# Patient Record
Sex: FEMALE | Race: ASIAN | NOT HISPANIC OR LATINO | URBAN - METROPOLITAN AREA
[De-identification: names, ages, dates, MRNs, and addresses within clinical notes are randomized per-mention and may not be internally consistent; named-entity substitution may affect disease eponyms.]

---

## 2022-04-29 ENCOUNTER — EMERGENCY (EMERGENCY)
Facility: HOSPITAL | Age: 22
LOS: 0 days | Discharge: HOME | End: 2022-04-30
Attending: EMERGENCY MEDICINE
Payer: COMMERCIAL

## 2022-04-29 VITALS
RESPIRATION RATE: 17 BRPM | DIASTOLIC BLOOD PRESSURE: 82 MMHG | SYSTOLIC BLOOD PRESSURE: 129 MMHG | HEART RATE: 95 BPM | WEIGHT: 160.06 LBS | OXYGEN SATURATION: 98 % | TEMPERATURE: 98 F | HEIGHT: 64 IN
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
## 2022-04-29 DIAGNOSIS — S01.81XA LACERATION WITHOUT FOREIGN BODY OF OTHER PART OF HEAD, INITIAL ENCOUNTER: ICD-10-CM

## 2022-04-29 DIAGNOSIS — S01.511A LACERATION WITHOUT FOREIGN BODY OF LIP, INITIAL ENCOUNTER: ICD-10-CM
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
## 2022-04-29 PROCEDURE — 12011 RPR F/E/E/N/L/M 2.5 CM/<: CPT

## 2022-04-29 PROCEDURE — 99283 EMERGENCY DEPT VISIT LOW MDM: CPT | Mod: 25

## 2022-04-29 NOTE — ED PROVIDER NOTE - SKIN COLOR
between philtrum on upper lip has < 1 cm laceration, superficial, with no extension tthrough the vermillion border. Inner upper lip has small bitemark on L. upper lip, does not go through and through to other superficial laceration. Also < 1 cm, superficial, and does not require suture repair. Has some superficial abrasion to the R. side of laceration on upper lip.

## 2022-04-29 NOTE — ED PROVIDER NOTE - CLINICAL SUMMARY MEDICAL DECISION MAKING FREE TEXT BOX
Ddx: Superficial laceration, no need for suture repair  Plan: Discussed with patient about likely small scar, as well as repair with tissue adhesive. Pt and mother in agreement, and laceration repaired. Antibiotic given.

## 2022-04-29 NOTE — ED PROVIDER NOTE - OBJECTIVE STATEMENT
Pt is a 20 yo lady with no significant past medical history who presents to the ED with mechanical fall. She tripped and fell forward and tried to catch herself with hands but still hit her face on the floor. No loc, but has laceration to upper lip. No neck pain. Is up to date on tdap. No loose teeth, no jaw misalignment.

## 2022-04-29 NOTE — ED PROVIDER NOTE - PATIENT PORTAL LINK FT
You can access the FollowMyHealth Patient Portal offered by [facility] by registering at the following website: [email or web address]. By joining [email or web address]’s FollowMyHealth portal, you will also be able to view your health information using other applications (apps) compatible with our system.

## 2022-04-29 NOTE — ED PROVIDER NOTE - CARE PROVIDER_API CALL
Ritesh Pierre (MD)  Plastic Surgery  [address] [place] [address], [address]  [place], [place] [id number]  Phone: [phone]  Fax: [phone]  Follow Up Time: 4-6 Days

## 2022-04-29 NOTE — ED ADULT NURSE NOTE - OBJECTIVE STATEMENT
c/o pain and laceration to above upper lip area. not actively bleeding. denies fever/chills/blurry vision/n/v/d/difficulty swallowing or breathing. A&O x4. states she took tetanus vaccine 10 months ago.

## 2022-04-29 NOTE — ED ADULT NURSE NOTE - NSIMPLEMENTINTERV_GEN_ALL_ED
Implemented All Universal Safety Interventions:  [place] to call system. Call bell, personal items and telephone within reach. Instruct patient to call for assistance. Room bathroom lighting operational. Non-slip footwear when patient is off stretcher. Physically safe environment: no spills, clutter or unnecessary equipment. Stretcher in lowest position, wheels locked, appropriate side rails in place.

## 2022-04-30 VITALS
OXYGEN SATURATION: 98 % | HEART RATE: 89 BPM | DIASTOLIC BLOOD PRESSURE: 85 MMHG | RESPIRATION RATE: 16 BRPM | SYSTOLIC BLOOD PRESSURE: 130 MMHG

## 2022-04-30 RX ADMIN — Medication 1 TABLET(S): at 00:22

## 2022-05-03 ENCOUNTER — EMERGENCY (EMERGENCY)
Age: 22
LOS: 1 days | Discharge: HOME | End: 2022-05-03
Attending: PEDIATRICS | Admitting: PEDIATRICS
Payer: MEDICAID

## 2022-05-03 VITALS
OXYGEN SATURATION: 97 % | WEIGHT: 177.91 LBS | SYSTOLIC BLOOD PRESSURE: 121 MMHG | RESPIRATION RATE: 18 BRPM | DIASTOLIC BLOOD PRESSURE: 69 MMHG | HEART RATE: 97 BPM | TEMPERATURE: 98 F

## 2022-05-03 PROCEDURE — 99283 EMERGENCY DEPT VISIT LOW MDM: CPT

## 2022-05-03 RX ORDER — BACITRACIN ZINC 500 UNIT/G
1 OINTMENT IN PACKET (EA) TOPICAL ONCE
Refills: 0 | Status: DISCONTINUED | OUTPATIENT
Start: 2022-05-03 | End: 2022-05-07

## 2022-05-03 NOTE — ED PROVIDER NOTE - PATIENT PORTAL LINK FT
You can access the FollowMyHealth Patient Portal offered by [facility] by registering at the following website: [email or web address]. By joining [employer]’s FollowMyHealth portal, you will also be able to view your health information using other applications (apps) compatible with our system. You can access the FollowMyHealth Patient Portal offered by [facility] by registering at the following website: [email or web address]. By joining [employer]’s FollowMyHealth portal, you will also be able to view your health information using other applications (apps) compatible with our system. You can access the FollowMyHealth Patient Portal offered by [facility] by registering at the following website: [email or web address]. By joining [employer]’s FollowMyHealth portal, you will also be able to view your health information using other applications (apps) compatible with our system.

## 2022-05-03 NOTE — ED PROVIDER NOTE - PHYSICAL EXAMINATION
Gauzes adherent to philtrum due to dermabond.  Removed with gel and saline  Abrasion to philtrum, granulation tissue.  Small abrasion to inner lip, c/w with bite marks, not through-and-through

## 2022-05-03 NOTE — ED PROVIDER NOTE - OBJECTIVE STATEMENT
Nicole is a 21y F here for evaluation of lip injury  Was sustained several days ago, fell and injured philtrum  Seen at [place] dermabond applied and patient said she was instructed not to change gauze for 4-5 days.  Pt attempted to remove gauze today but it was stuck.  NO fevers, no drainage.  Has been on augmentin as prescribed Nicole is a 21y F here for evaluation of lip injury  Was sustained several days ago, fell and injured philtrum  Seen at [place] dermabond applied and patient said she was instructed not to change gauze for 4-5 days.  Pt attempted to remove gauze today but it was stuck.  NO fevers, no drainage.  Has been on augmentin as prescribed Nicole is a 21y F here for evaluation of lip injury  Was sustained several days ago, fell and injured philtrum  Seen at [place] dermabond applied and patient said she was instructed not to change gauze for 4-5 days.  Pt attempted to remove gauze today but it was stuck.  NO fevers, no drainage.  Has been on augmentin as prescribed

## 2022-05-03 NOTE — ED PROVIDER NOTE - NSFOLLOWUPINSTRUCTIONS_ED_ALL_ED_FT
Please clean daily with warm water and soap and apply neosporin.  Return for fevers, severe pain.    Follow-up with plastic surgery.

## 2022-05-03 NOTE — ED PEDIATRIC TRIAGE NOTE - CHIEF COMPLAINT QUOTE
BIB mom for mouth laceration on upper lip that occurred on Friday. Pt seen at [facility] ER and dx with facial laceration. Pt says they used "glue" for laceration. Tried to remove guaze but unable to do so and now having swelling of the lip. No fevers. Pt Apical pulse auscultated and correlates with VS machine. Denies PMH/PSH. NKDA. Vaccines up to date. BIB mom for mouth laceration on upper lip that occurred on Friday. Pt seen at [facility] ER and dx with facial laceration. Pt says they used "glue" for laceration. Tried to remove guaze but unable to do so and now having swelling of the lip. No fevers. Pt Apical pulse auscultated and correlates with VS machine. Denies PMH/PSH. NKDA. Vaccines up to date. BIB mom for mouth laceration on upper lip that occurred on Friday. Pt seen at [facility] ER and dx with facial laceration. Pt says they used "glue" for laceration. Tried to remove guaze but unable to do so and now having swelling of the lip. No fevers. Pt Apical pulse auscultated and correlates with VS machine. Denies PMH/PSH. NKDA. Vaccines up to date.

## 2022-05-03 NOTE — ED PROVIDER NOTE - CLINICAL SUMMARY MEDICAL DECISION MAKING FREE TEXT BOX
Richie Horton DO (PEM Attending): Lip abrasion sustained 4d ago, seen at , treated with dermabond. Pt unable to remove gauze.  Gauze was adhered due to dermabond and dried blood.   Removed. Granulation tissue and abrasion. No signs of through and trhough at this time.  Already on Augmentin and f/u with plastics.

## 2022-05-03 NOTE — ED PROVIDER NOTE - NSICDXNOPASTSURGICALHX_GEN_ALL_ED
1/19/2022      Saloni Case MD  Physical Medicine and Rehabilitation  [facility], [address] [place] [address]  Dept: [phone]  Dept Fax: [phone]        RE: Consultation for Issa Sarkar        Dear Prema Garcia MD,    Thank <-- Click to add NO significant Past Surgical History

## 2023-11-28 ENCOUNTER — OFFICE (OUTPATIENT)
Dept: URBAN - METROPOLITAN AREA CLINIC 35 | Facility: CLINIC | Age: 23
Setting detail: OPHTHALMOLOGY
End: 2023-11-28
Payer: COMMERCIAL

## 2023-11-28 ENCOUNTER — RX ONLY (RX ONLY)
Age: 23
End: 2023-11-28

## 2023-11-28 DIAGNOSIS — H16.222: ICD-10-CM
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
## 2023-11-28 DIAGNOSIS — H16.221: ICD-10-CM

## 2023-11-28 DIAGNOSIS — H16.223: ICD-10-CM

## 2023-11-28 PROCEDURE — 92004 COMPRE OPH EXAM NEW PT 1/>: CPT | Performed by: OPHTHALMOLOGY

## 2023-11-28 PROCEDURE — BRUDER EYE BRUDER EYE PADS: Performed by: OPHTHALMOLOGY

## 2023-11-28 PROCEDURE — 83861 MICROFLUID ANALY TEARS: CPT | Mod: QW,RT | Performed by: OPHTHALMOLOGY

## 2023-11-28 PROCEDURE — 83861 MICROFLUID ANALY TEARS: CPT | Mod: QW,LT | Performed by: OPHTHALMOLOGY

## 2023-11-28 ASSESSMENT — SUPERFICIAL PUNCTATE KERATITIS (SPK)
OD_SPK: 1+
OS_SPK: 1+

## 2023-11-28 ASSESSMENT — CONFRONTATIONAL VISUAL FIELD TEST (CVF)
OD_FINDINGS: FULL
OS_FINDINGS: FULL

## 2023-11-28 ASSESSMENT — REFRACTION_AUTOREFRACTION
OD_CYLINDER: -0.50
OS_CYLINDER: -1.00
OD_SPHERE: +0.25
OS_SPHERE: +1.25
OD_AXIS: 069
OS_AXIS: 100

## 2023-11-28 ASSESSMENT — SPHEQUIV_DERIVED
OS_SPHEQUIV: 0.75
OD_SPHEQUIV: 0